# Patient Record
Sex: MALE | Race: WHITE | NOT HISPANIC OR LATINO | Employment: FULL TIME | URBAN - METROPOLITAN AREA
[De-identification: names, ages, dates, MRNs, and addresses within clinical notes are randomized per-mention and may not be internally consistent; named-entity substitution may affect disease eponyms.]

---

## 2023-11-13 ENCOUNTER — OFFICE VISIT (OUTPATIENT)
Dept: FAMILY MEDICINE | Facility: CLINIC | Age: 43
End: 2023-11-13

## 2023-11-13 VITALS
OXYGEN SATURATION: 98 % | WEIGHT: 191.8 LBS | HEIGHT: 76 IN | BODY MASS INDEX: 23.36 KG/M2 | SYSTOLIC BLOOD PRESSURE: 129 MMHG | HEART RATE: 55 BPM | DIASTOLIC BLOOD PRESSURE: 77 MMHG

## 2023-11-13 DIAGNOSIS — R07.89 CHEST WALL PAIN: Primary | ICD-10-CM

## 2023-11-13 PROBLEM — L65.9 HAIR THINNING: Status: ACTIVE | Noted: 2023-11-13

## 2023-11-13 PROCEDURE — 99213 OFFICE O/P EST LOW 20 MIN: CPT

## 2023-11-13 RX ORDER — CETIRIZINE HYDROCHLORIDE 10 MG/1
10 TABLET ORAL DAILY
COMMUNITY

## 2023-11-13 RX ORDER — FINASTERIDE 1 MG/1
1 TABLET, FILM COATED ORAL DAILY
COMMUNITY

## 2023-11-13 NOTE — PROGRESS NOTES
Assessment & Plan     Chest wall pain  -Symptoms at this time sound related to a musculoskeletal soft tissue chest wall pain.   -No evidence of bony rib fractures.  -Recommend Ibuprofen as needed for episodes.   -Based on exam and history, CXR not indicated.  I offered one the the patient anyway, but they declined.   -NSAID's activity as tolerated.   -Can expect waxing and waning course of gradual improvement over next few weeks.  -Discussed risk of PNE and atelectasis with underventilation. Asked them to brace well with coughing and to deep breathe as much as possible to help keep the lungs expanded as to avoid infection.    -Discussed to return if any changes in symptoms, including shortness of breath, dyspnea on exertion, fevers, chills, productive cough.             FUTURE APPOINTMENTS:       - Follow-up for annual visit or as needed  See Patient Instructions    No follow-ups on file.    JACK [facility]    Subjective   Lion is a 43 year old, presenting for the following health issues:  New Patient (Wife found us for him - needed PCP) and Breathing Problem (Off/on - chest pain x 6 weeks )    HPI       Chest Pain/breathing  Onset/Duration: 6 weeks ago, Started out of nowhere, contributes it to possibly having Covid. Symptoms started out as lasting a full day but now just short episodes intermittently with deep breaths.   Description:   Location: Middle  Character: achey, occ sharp  Radiation: none  Duration: intermittent   Intensity: 4/10  Progression of Symptoms: improving  Accompanying Signs & Symptoms:  Shortness of breath: YES, has got better  Sweating: YES, once over duration of symptoms at night  Nausea/vomiting: No  Lightheadedness: YES  Palpitations: YES  Fever/Chills: YES- Chills. 99.9 F, One day about 10 days ago  Cough: No           Heartburn: No  History:   Family history of heart disease: No  Tobacco use: No  Previous similar symptoms: no   Precipitating factors:  "  Worse with exertion: YES- but overall has improved and only occurs with certain motions now  Worse with deep breaths: YES           Related to eating: No           Better with burping: No  Alleviating factors: None, sleeping  Therapies tried and outcome: nothing        Review of Systems   Constitutional, HEENT, cardiovascular, pulmonary, gi and gu systems are negative, except as otherwise noted.      Objective    /77   Pulse 55   Ht 1.918 m (6' 3.5\")   Wt 87 kg (191 lb 12.8 oz)   SpO2 98%   BMI 23.66 kg/m    Body mass index is 23.66 kg/m .  Physical Exam   GENERAL: healthy, alert and no distress  NECK: no adenopathy, no asymmetry, masses, or scars and thyroid normal to palpation  RESP: lungs clear to auscultation - no rales, rhonchi or wheezes  CV: regular rate and rhythm, normal S1 S2, no S3 or S4, no murmur, click or rub, no peripheral edema and peripheral pulses strong  MS: no gross musculoskeletal defects noted, no edema  NEURO: Normal strength and tone, mentation intact and speech normal  PSYCH: mentation appears normal, affect normal/bright            Chanda Nicholson, JACK CNP on 11/13/2023 at 9:10 AM        "

## 2024-04-01 ENCOUNTER — OFFICE VISIT (OUTPATIENT)
Dept: FAMILY MEDICINE | Facility: CLINIC | Age: 44
End: 2024-04-01

## 2024-04-01 VITALS
OXYGEN SATURATION: 98 % | HEIGHT: 75 IN | SYSTOLIC BLOOD PRESSURE: 126 MMHG | DIASTOLIC BLOOD PRESSURE: 84 MMHG | BODY MASS INDEX: 23.67 KG/M2 | HEART RATE: 56 BPM | WEIGHT: 190.4 LBS

## 2024-04-01 DIAGNOSIS — Z13.6 ENCOUNTER FOR SCREENING FOR CARDIOVASCULAR DISORDERS: ICD-10-CM

## 2024-04-01 DIAGNOSIS — Z13.228 ENCOUNTER FOR SCREENING FOR METABOLIC DISORDER: ICD-10-CM

## 2024-04-01 DIAGNOSIS — Z13.1 SCREENING FOR DIABETES MELLITUS: ICD-10-CM

## 2024-04-01 DIAGNOSIS — Z00.00 ROUTINE GENERAL MEDICAL EXAMINATION AT A HEALTH CARE FACILITY: Primary | ICD-10-CM

## 2024-04-01 LAB
ALBUMIN SERPL BCG-MCNC: 4.6 G/DL (ref 3.5–5.2)
ALP SERPL-CCNC: 60 U/L (ref 40–150)
ALT SERPL W P-5'-P-CCNC: 26 U/L (ref 0–70)
ANION GAP SERPL CALCULATED.3IONS-SCNC: 10 MMOL/L (ref 7–15)
AST SERPL W P-5'-P-CCNC: 29 U/L (ref 0–45)
BILIRUB SERPL-MCNC: 0.2 MG/DL
BUN SERPL-MCNC: 17.5 MG/DL (ref 6–20)
CALCIUM SERPL-MCNC: 9.4 MG/DL (ref 8.6–10)
CHLORIDE SERPL-SCNC: 100 MMOL/L (ref 98–107)
CHOLESTEROL: 241 MG/DL (ref 100–199)
CREAT SERPL-MCNC: 1.23 MG/DL (ref 0.67–1.17)
DEPRECATED HCO3 PLAS-SCNC: 28 MMOL/L (ref 22–29)
EGFRCR SERPLBLD CKD-EPI 2021: 75 ML/MIN/1.73M2
FASTING?: YES
GLUCOSE SERPL-MCNC: 78 MG/DL (ref 70–99)
HBA1C MFR BLD: 5.4 %
HDL (RMG): 55 MG/DL (ref 40–?)
LDL CALCULATED (RMG): 156 MG/DL (ref 0–130)
POTASSIUM SERPL-SCNC: 4.2 MMOL/L (ref 3.4–5.3)
PROT SERPL-MCNC: 7.3 G/DL (ref 6.4–8.3)
SODIUM SERPL-SCNC: 138 MMOL/L (ref 135–145)
TRIGLYCERIDES (RMG): 148 MG/DL (ref 0–149)

## 2024-04-01 PROCEDURE — 83036 HEMOGLOBIN GLYCOSYLATED A1C: CPT | Performed by: FAMILY MEDICINE

## 2024-04-01 PROCEDURE — 80061 LIPID PANEL: CPT | Mod: QW | Performed by: FAMILY MEDICINE

## 2024-04-01 PROCEDURE — 80053 COMPREHEN METABOLIC PANEL: CPT | Performed by: FAMILY MEDICINE

## 2024-04-01 PROCEDURE — 36415 COLL VENOUS BLD VENIPUNCTURE: CPT | Performed by: FAMILY MEDICINE

## 2024-04-01 PROCEDURE — 99396 PREV VISIT EST AGE 40-64: CPT | Performed by: FAMILY MEDICINE

## 2024-04-01 SDOH — HEALTH STABILITY: PHYSICAL HEALTH: ON AVERAGE, HOW MANY MINUTES DO YOU ENGAGE IN EXERCISE AT THIS LEVEL?: 150+ MIN

## 2024-04-01 SDOH — HEALTH STABILITY: PHYSICAL HEALTH: ON AVERAGE, HOW MANY DAYS PER WEEK DO YOU ENGAGE IN MODERATE TO STRENUOUS EXERCISE (LIKE A BRISK WALK)?: 5 DAYS

## 2024-04-01 ASSESSMENT — SOCIAL DETERMINANTS OF HEALTH (SDOH): HOW OFTEN DO YOU GET TOGETHER WITH FRIENDS OR RELATIVES?: TWICE A WEEK

## 2024-04-01 NOTE — PATIENT INSTRUCTIONS
Preventive Care Advice   This is general advice given by our system to help you stay healthy. However, your care team may have specific advice just for you. Please talk to your care team about your preventive care needs.  Nutrition  Eat 5 or more servings of fruits and vegetables each day.  Try wheat bread, brown rice and whole grain pasta (instead of white bread, rice, and pasta).  Get enough calcium and vitamin D. Check the label on foods and aim for 100% of the RDA (recommended daily allowance).  Lifestyle  Exercise at least 150 minutes each week   (30 minutes a day, 5 days a week).  Do muscle strengthening activities 2 days a week. These help control your weight and prevent disease.  No smoking.  Wear sunscreen to prevent skin cancer.  Have a dental exam and cleaning every 6 months.  Yearly exams  See your health care team every year to talk about:  Any changes in your health.  Any medicines your care team has prescribed.  Preventive care, family planning, and ways to prevent chronic diseases.  Shots (vaccines)   HPV shots (up to age 26), if you've never had them before.  Hepatitis B shots (up to age 59), if you've never had them before.  COVID-19 shot: Get this shot when it's due.  Flu shot: Get a flu shot every year.  Tetanus shot: Get a tetanus shot every 10 years.  Pneumococcal, hepatitis A, and RSV shots: Ask your care team if you need these based on your risk.  Shingles shot (for age 50 and up).  General health tests  Diabetes screening:  Starting at age 35, Get screened for diabetes at least every 3 years.  If you are younger than age 35, ask your care team if you should be screened for diabetes.  Cholesterol test: At age 39, start having a cholesterol test every 5 years, or more often if advised.  Bone density scan (DEXA): At age 50, ask your care team if you should have this scan for osteoporosis (brittle bones).  Hepatitis C: Get tested at least once in your life.  STIs (sexually transmitted  infections)  Before age 24: Ask your care team if you should be screened for STIs.  After age 24: Get screened for STIs if you're at risk. You are at risk for STIs (including HIV) if:  You are sexually active with more than one person.  You don't use condoms every time.  You or a partner was diagnosed with a sexually transmitted infection.  If you are at risk for HIV, ask about PrEP medicine to prevent HIV.  Get tested for HIV at least once in your life, whether you are at risk for HIV or not.  Cancer screening tests  Cervical cancer screening: If you have a cervix, begin getting regular cervical cancer screening tests at age 21. Most people who have regular screenings with normal results can stop after age 65. Talk about this with your provider.  Breast cancer scan (mammogram): If you've ever had breasts, begin having regular mammograms starting at age 40. This is a scan to check for breast cancer.  Colon cancer screening: It is important to start screening for colon cancer at age 45.  Have a colonoscopy test every 10 years (or more often if you're at risk) Or, ask your provider about stool tests like a FIT test every year or Cologuard test every 3 years.  To learn more about your testing options, visit: https://www.[employer]/229296.pdf.  For help making a decision, visit: https://bit.ly/ls97524.  Prostate cancer screening test: If you have a prostate and are age 55 to 69, ask your provider if you would benefit from a yearly prostate cancer screening test.  Lung cancer screening: If you are a current or former smoker age 50 to 80, ask your care team if ongoing lung cancer screenings are right for you.  For informational purposes only. Not to replace the advice of your health care provider. Copyright   2023 [employer]. All rights reserved. Clinically reviewed by the [facility] Transitions Program. [employer] [id number] - REV 01/24.

## 2024-04-01 NOTE — PROGRESS NOTES
Preventive Care Visit  [facility]  Stew Pascual MD, Family Medicine  Apr 1, 2024      Assessment & Plan     Routine general medical examination at a health care facility  - discussed preventative guidelines, healthy diet, exercise and weight management  - VENOUS COLLECTION    Encounter for screening for cardiovascular disorders    - Lipid Profile ([facility])  - CT Calcium Screening; Future  - VENOUS COLLECTION    Encounter for screening for metabolic disorder    - Comprehensive metabolic panel; Future  - VENOUS COLLECTION  - Comprehensive metabolic panel    Screening for diabetes mellitus    - Hemoglobin A1c; Future  - VENOUS COLLECTION  - Hemoglobin A1c    Patient has been advised of split billing requirements and indicates understanding: Yes          Counseling  Appropriate preventive services were discussed with this patient, including applicable screening as appropriate for fall prevention, nutrition, physical activity, Tobacco-use cessation, weight loss and cognition.  Checklist reviewing preventive services available has been given to the patient.  Reviewed patient's diet, addressing concerns and/or questions.       See Patient Instructions    Return in about 53 weeks (around 4/7/2025) for Annual Wellness Visit.    Jese Seymour is a 43 year old, presenting for the following:  Physical         Health Care Directive  Patient does not have a Health Care Directive or Living Will: Discussed advance care planning with patient; however, patient declined at this time.    HPI    Here for CPE.  No specific concerns.  Doing well          4/1/2024   General Health   How would you rate your overall physical health? Good   Feel stress (tense, anxious, or unable to sleep) Not at all         4/1/2024   Nutrition   Three or more servings of calcium each day? Yes   Diet: Regular (no restrictions)   How many servings of fruit and vegetables per day? 4 or more   How many sweetened beverages each day? 0-1  "        4/1/2024   Exercise   Days per week of moderate/strenous exercise 5 days   Average minutes spent exercising at this level 150+ min         4/1/2024   Social Factors   Frequency of gathering with friends or relatives Twice a week   Worry food won't last until get money to buy more No   Food not last or not have enough money for food? No   Do you have housing?  Yes   Are you worried about losing your housing? No   Lack of transportation? No   Unable to get utilities (heat,electricity)? No         4/1/2024   Dental   Dentist two times every year? Yes         4/1/2024   TB Screening   Were you born outside of the [place]? No         Today's PHQ-2 Score:       4/1/2024     3:35 PM   PHQ-2 ( 1999 [employer])   Q1: Little interest or pleasure in doing things 0   Q2: Feeling down, depressed or hopeless 0   PHQ-2 Score 0           4/1/2024   Substance Use   Alcohol more than 3/day or more than 7/wk No   Do you use any other substances recreationally? (!) CANNABIS PRODUCTS     Social History     Tobacco Use    Smoking status: Never    Smokeless tobacco: Never   Substance Use Topics    Alcohol use: Yes     Comment: couple drinks a week             4/1/2024   One time HIV Screening   Previous HIV test? No         4/1/2024   STI Screening   New sexual partner(s) since last STI/HIV test? No   ASCVD Risk   The ASCVD Risk score (Chalino GO, et al., 2019) failed to calculate for the following reasons:    Cannot find a previous HDL lab    Cannot find a previous total cholesterol lab        4/1/2024   Contraception/Family Planning   Questions about contraception or family planning No        Reviewed and updated as needed this visit by Provider                          Review of Systems  Constitutional, HEENT, cardiovascular, pulmonary, GI, , musculoskeletal, neuro, skin, endocrine and psych systems are negative, except as otherwise noted.     Objective    Exam  /84   Pulse 56   Ht 1.905 m (6' 3\")   Wt 86.4 kg (190 lb " "6.4 oz)   SpO2 98%   BMI 23.80 kg/m     Estimated body mass index is 23.8 kg/m  as calculated from the following:    Height as of this encounter: 1.905 m (6' 3\").    Weight as of this encounter: 86.4 kg (190 lb 6.4 oz).    Physical Exam  GENERAL: alert and no distress  EYES: Eyes grossly normal to inspection, PERRL and conjunctivae and sclerae normal  HENT: ear canals and TM's normal, nose and mouth without ulcers or lesions  NECK: no adenopathy, no asymmetry, masses, or scars  RESP: lungs clear to auscultation - no rales, rhonchi or wheezes  CV: regular rate and rhythm, normal S1 S2, no S3 or S4, no murmur, click or rub, no peripheral edema  ABDOMEN: soft, nontender, no hepatosplenomegaly, no masses and bowel sounds normal  MS: no gross musculoskeletal defects noted, no edema  SKIN: no suspicious lesions or rashes  NEURO: Normal strength and tone, mentation intact and speech normal  PSYCH: mentation appears normal, affect normal/bright        Signed Electronically by: Stew Pascual MD    "

## 2024-04-01 NOTE — LETTER
"April 4, 2024        Lion Hardy  [address] [place] [address]        Dear Lion,     Your recent lab results are within the expected range.     Your creatinine is a measure of kidney function and is mildly elevated.  However, your GFR is normal and I rely on this value more.  Creatinine can be increased in people with more muscle mass.     Your liver function and electrolytes are normal.     Your cholesterol levels are mildly elevated.  I would recommend a diet similar to that of the \"Mediterranean diet.\"  This is high in vegetables, healthy fats (olive oil, avocado, salmon), lean protein and whole grains.  It is important to limit simple sugars and saturated fats.  It is also important to exercise regularly.  No medication is needed but we should recheck this again in one year.  The calcium scan will also be helpful.     Your A1c, which reflects your blood sugar control over the past 3 months, is normal.        It was great meeting you.  If you have any questions, please contact our office.        Sincerely,     Stew Pascual MD     Resulted Orders   Lipid Profile ([facility])   Result Value Ref Range    Cholesterol 241 (A) 100 - 199 mg/dL    HDL 55 40 mg/dL    Triglycerides 148 0 - 149 mg/dL    LDL CALCULATED ([facility]) 156 (A) 0 - 130 mg/dL    Patient Fasting? Yes    Comprehensive metabolic panel   Result Value Ref Range    Sodium 138 135 - 145 mmol/L      Comment:      Reference intervals for this test were updated on 09/26/2023 to more accurately reflect our healthy population. There may be differences in the flagging of prior results with similar values performed with this method. Interpretation of those prior results can be made in the context of the updated reference intervals.     Potassium 4.2 3.4 - 5.3 mmol/L    Carbon Dioxide (CO2) 28 22 - 29 mmol/L    Anion Gap 10 7 - 15 mmol/L    Urea Nitrogen 17.5 6.0 - 20.0 mg/dL    Creatinine 1.23 (H) 0.67 - 1.17 mg/dL    GFR Estimate 75 >60 mL/min/1.73m2    " Calcium 9.4 8.6 - 10.0 mg/dL    Chloride 100 98 - 107 mmol/L    Glucose 78 70 - 99 mg/dL    Alkaline Phosphatase 60 40 - 150 U/L      Comment:      Reference intervals for this test were updated on 11/14/2023 to more accurately reflect our healthy population. There may be differences in the flagging of prior results with similar values performed with this method. Interpretation of those prior results can be made in the context of the updated reference intervals.    AST 29 0 - 45 U/L      Comment:      Reference intervals for this test were updated on 6/12/2023 to more accurately reflect our healthy population. There may be differences in the flagging of prior results with similar values performed with this method. Interpretation of those prior results can be made in the context of the updated reference intervals.    ALT 26 0 - 70 U/L      Comment:      Reference intervals for this test were updated on 6/12/2023 to more accurately reflect our healthy population. There may be differences in the flagging of prior results with similar values performed with this method. Interpretation of those prior results can be made in the context of the updated reference intervals.      Protein Total 7.3 6.4 - 8.3 g/dL    Albumin 4.6 3.5 - 5.2 g/dL    Bilirubin Total 0.2 <=1.2 mg/dL   Hemoglobin A1c   Result Value Ref Range    Hemoglobin A1C 5.4 <5.7 %      Comment:      Normal <5.7%   Prediabetes 5.7-6.4%    Diabetes 6.5% or higher     Note: Adopted from ADA consensus guidelines.       If you have any questions or concerns, please call the clinic at the number listed above.       Sincerely,      Stew Pascual MD

## 2024-08-15 ENCOUNTER — TELEPHONE (OUTPATIENT)
Dept: FAMILY MEDICINE | Facility: CLINIC | Age: 44
End: 2024-08-15

## 2024-08-15 NOTE — TELEPHONE ENCOUNTER
1st attempt- Sent MyChart for the patient to call back and schedule the following:    Appointment type:  Testing only- No clinic visit  Provider:  Ankur  Return date:  next avail  Additional appointment(s needed:  CT Calcium  Additonal Notes:     Specialty phone number: [phone]

## 2024-09-28 ENCOUNTER — OFFICE VISIT (OUTPATIENT)
Dept: URGENT CARE | Facility: URGENT CARE | Age: 44
End: 2024-09-28
Payer: COMMERCIAL

## 2024-09-28 VITALS
OXYGEN SATURATION: 98 % | TEMPERATURE: 97.8 F | SYSTOLIC BLOOD PRESSURE: 112 MMHG | RESPIRATION RATE: 16 BRPM | BODY MASS INDEX: 24.37 KG/M2 | WEIGHT: 195 LBS | DIASTOLIC BLOOD PRESSURE: 76 MMHG | HEART RATE: 64 BPM

## 2024-09-28 DIAGNOSIS — J30.2 SEASONAL ALLERGIC RHINITIS, UNSPECIFIED TRIGGER: ICD-10-CM

## 2024-09-28 DIAGNOSIS — J02.9 PHARYNGITIS, UNSPECIFIED ETIOLOGY: Primary | ICD-10-CM

## 2024-09-28 DIAGNOSIS — H65.03 NON-RECURRENT ACUTE SEROUS OTITIS MEDIA OF BOTH EARS: ICD-10-CM

## 2024-09-28 LAB
DEPRECATED S PYO AG THROAT QL EIA: NEGATIVE
GROUP A STREP BY PCR: NOT DETECTED

## 2024-09-28 PROCEDURE — 99203 OFFICE O/P NEW LOW 30 MIN: CPT | Performed by: FAMILY MEDICINE

## 2024-09-28 PROCEDURE — 87651 STREP A DNA AMP PROBE: CPT | Performed by: FAMILY MEDICINE

## 2024-09-28 RX ORDER — FLUTICASONE PROPIONATE 50 MCG
1 SPRAY, SUSPENSION (ML) NASAL 2 TIMES DAILY
Qty: 16 G | Refills: 0 | Status: SHIPPED | OUTPATIENT
Start: 2024-09-28

## 2024-09-28 NOTE — PROGRESS NOTES
ASSESSMENT/PLAN:      ICD-10-CM    1. Pharyngitis, unspecified etiology  J02.9 Streptococcus A Rapid Screen w/Reflex to PCR - Clinic Collect     Group A Streptococcus PCR Throat Swab    Strep negative, PCR pending      2. Non-recurrent acute serous otitis media of both ears  H65.03 fluticasone (FLONASE) 50 MCG/ACT nasal spray      3. Seasonal allergic rhinitis, unspecified trigger  J30.2           Patient Instructions   Fluid behind the eardrums-called serous otitis media     Start Mucinex -1200 mg daily -the generic is fine -for congestion/sinus congestion and fluid behind the ear drums for 14 days     Start Flonase1 spray in each nostril in am and bedtime for nasal congestion and drainage and fluid behind the ear drums for 14 days     Continue zyrtec daily      Your rapid strep test was negative-your sore could be due to allergies or could be a viral sore throat the pain should resolve within 48 hours of onset of pain    For throat pain    Start  Ibuprofen (motrin/advil)  600 mg 3 times a day always take with food for the next 2 to 3 days or until pain resolves-maximum dose of ibuprofen is 1800 mg in 24 hours     If you have pain before you are due for your next dose of ibuprofen start Tylenol as noted below    Acetaminophen (Tylenol ) 1000 mg 3 times a day for the next 5 to 7 days until pain resolves-maximum dose of acetaminophen (tylenol)  is 3000 mg in 24-hours           if your second strep test is positive  Please call the nurse line-number note on corner of your handout  and they will have an on call provider order antibiotic for you   You will need to be on antibiotic treatment for 12 hours before returning to school/  Change your toothbrush in 3 days to prevent reinfection.  For your sore throat, you may try tylenol/ibuprofen, , warm beverages.  Make sure to drink plenty of fluids and wash your hands often.  Follow-up with your PCP if you have continued pain in 3-5 days.           Return to clinic or  to ER if symptoms worsen     Follow up with your primary care provider or clinic in about 2-3 days  if your symptoms do not improve            Reviewed medication instructions and side effects. Follow up if experiences side effects.     I reviewed supportive care, otc meds to use if needed, expected course, and signs of concern.  Follow up as needed or if he does not improve within  1-2 days or if worsens in any way.  Reviewed red flag symptoms and is to go to the ER if experiences any of these.     The use of Dragon/PowerMic dictation services may have been used to construct the content in this note; any grammatical or spelling errors are non-intentional. Please contact the author of this note directly if you are in need of any clarification.                   Patient presents with:  Pharyngitis: Sore throat since yesterday.  Urgent Care       Subjective     Lion Hardy is a 44 year old male who presents to clinic today for the following health issues:    HPI     Acute Illness  Acute illness concerns: Onset yesterday sore throat, painful to swallow, nasal congestion and myalgias    Symptoms:  Fever: No  Chills/Sweats: No  Headache (location?): No  Sinus Pressure: No  Conjunctivitis:  No  Ear Pain: YES: Ear fullness intermittent ear discomfort  Rhinorrhea: YES  Congestion: YES  Sore Throat: YES-painful to swallow  Cough: no  Wheeze: No-history of asthma  Decreased Appetite: No  Nausea: No  Vomiting: No  Diarrhea: No  Fatigue/Achiness: YES-achiness, mild fatigue  Sick/Strep Exposure: YES-children-no symptoms, attending school, strep and COVID prevalent at school  Therapies tried and outcome:   Took a home COVID test this a.m. which was negative,   he is completing his vaccinations for COVID,   his last COVID was 6 weeks ago      No past medical history on file.  Social History     Tobacco Use    Smoking status: Never    Smokeless tobacco: Never   Substance Use Topics    Alcohol use: Yes     Comment: couple  drinks a week       Current Outpatient Medications   Medication Sig Dispense Refill    fluticasone (FLONASE) 50 MCG/ACT nasal spray Spray 1 spray into both nostrils 2 times daily. 16 g 0    cetirizine (ZYRTEC) 10 MG tablet Take 10 mg by mouth daily      finasteride (PROPECIA) 1 MG tablet Take 1 mg by mouth daily       No Known Allergies          ROS are negative, except as otherwise noted HPI      Objective    /76   Pulse 64   Temp 97.8  F (36.6  C) (Tympanic)   Resp 16   Wt 88.5 kg (195 lb)   SpO2 98%   BMI 24.37 kg/m    Body mass index is 24.37 kg/m .  Physical Exam     GENERAL:  Alert, mild distress.   HEENT: Diffuse pharyngeal erythema. Tonsils erythematous mildly enlarged no exudate.  Sclera, lids and conjunctiva are normal.  Nose congestion and ears-canals clear bilateral, pinnas clear bilateral, TMs dull air-fluid levels bilateral  NECK: A few shotty anterior chain bilateral adenopathy.  CHEST:  clear, no wheezing or rales. Normal symmetric air entry throughout both lung fields.   HEART:  S1 and S2 normal, no murmurs, clicks, gallops or rubs. Regular rate and rhythm.  NEURO:Alert and oriented x3, normal strength and tone, normal gait      Diagnostic Test Results:  Labs reviewed in Epic  Results for orders placed or performed in visit on 09/28/24   Streptococcus A Rapid Screen w/Reflex to PCR - Clinic Collect     Status: Normal    Specimen: Throat; Swab   Result Value Ref Range    Group A Strep antigen Negative Negative   Group A Streptococcus PCR Throat Swab     Status: Normal    Specimen: Throat; Swab   Result Value Ref Range    Group A strep by PCR Not Detected Not Detected    Narrative    The Xpert Xpress Strep A test, performed on the [employer], is a rapid, qualitative in vitro diagnostic test for the detection of Streptococcus pyogenes (Group A ß-hemolytic Streptococcus, Strep A) in throat swab specimens from patients with signs and symptoms of pharyngitis. The Xpert  Xpress Strep A test can be used as an aid in the diagnosis of Group A Streptococcal pharyngitis. The assay is not intended to monitor treatment for Group A Streptococcus infections. The Xpert Xpress Strep A test utilizes an automated real-time polymerase chain reaction (PCR) to detect Streptococcus pyogenes DNA.

## 2024-09-28 NOTE — PATIENT INSTRUCTIONS
Fluid behind the eardrums-called serous otitis media     Start Mucinex -1200 mg daily -the generic is fine -for congestion/sinus congestion and fluid behind the ear drums for 14 days     Start Flonase1 spray in each nostril in am and bedtime for nasal congestion and drainage and fluid behind the ear drums for 14 days     Continue zyrtec daily      Your rapid strep test was negative-your sore could be due to allergies or could be a viral sore throat the pain should resolve within 48 hours of onset of pain    For throat pain    Start  Ibuprofen (motrin/advil)  600 mg 3 times a day always take with food for the next 2 to 3 days or until pain resolves-maximum dose of ibuprofen is 1800 mg in 24 hours     If you have pain before you are due for your next dose of ibuprofen start Tylenol as noted below    Acetaminophen (Tylenol ) 1000 mg 3 times a day for the next 5 to 7 days until pain resolves-maximum dose of acetaminophen (tylenol)  is 3000 mg in 24-hours           if your second strep test is positive  Please call the nurse line-number note on corner of your handout  and they will have an on call provider order antibiotic for you   You will need to be on antibiotic treatment for 12 hours before returning to school/  Change your toothbrush in 3 days to prevent reinfection.  For your sore throat, you may try tylenol/ibuprofen, , warm beverages.  Make sure to drink plenty of fluids and wash your hands often.  Follow-up with your PCP if you have continued pain in 3-5 days.           Return to clinic or to ER if symptoms worsen     Follow up with your primary care provider or clinic in about 2-3 days  if your symptoms do not improve

## 2025-05-03 ENCOUNTER — HEALTH MAINTENANCE LETTER (OUTPATIENT)
Age: 45
End: 2025-05-03

## 2025-05-08 ENCOUNTER — OFFICE VISIT (OUTPATIENT)
Dept: FAMILY MEDICINE | Facility: CLINIC | Age: 45
End: 2025-05-08

## 2025-05-08 VITALS
WEIGHT: 187.6 LBS | HEIGHT: 75 IN | OXYGEN SATURATION: 98 % | SYSTOLIC BLOOD PRESSURE: 130 MMHG | HEART RATE: 68 BPM | BODY MASS INDEX: 23.32 KG/M2 | DIASTOLIC BLOOD PRESSURE: 67 MMHG

## 2025-05-08 DIAGNOSIS — M1A.9XX0 CHRONIC GOUT INVOLVING TOE OF RIGHT FOOT WITHOUT TOPHUS, UNSPECIFIED CAUSE: Primary | ICD-10-CM

## 2025-05-08 PROCEDURE — 99213 OFFICE O/P EST LOW 20 MIN: CPT | Performed by: STUDENT IN AN ORGANIZED HEALTH CARE EDUCATION/TRAINING PROGRAM

## 2025-05-08 RX ORDER — INDOMETHACIN 50 MG/1
50 CAPSULE ORAL
Qty: 30 CAPSULE | Refills: 0 | Status: SHIPPED | OUTPATIENT
Start: 2025-05-08 | End: 2025-05-18

## 2025-05-08 NOTE — PROGRESS NOTES
Assessment & Plan   Assessment & Plan  Chronic gout involving toe of right foot without tophus, unspecified cause  -patient endorses swelling and tenderness around right bunion   -original history was concerning for possible ortho pain   -has been diagnosed with gout and has strong family history of gout  -will refill indomethacin  -given that this episode is improving, will not order uric acid at this time, can consider with annual physical blood work  Orders:    indomethacin (INDOCIN) 50 MG capsule; Take 1 capsule (50 mg) by mouth 3 times daily (with meals) for 10 days.       Follow-up   Return for Routine preventive.    Subjective   Lion is a 44 year old, presenting for the following health issues:  Arthritis (Fhx of gout, has been dealing with pain since Sunday on R big toe, and has been keeping up with hydration, and icing foot, has a rx for flare ups when they occur although he is out, and looking to get more )    Arthritis    History of Present Illness       Reason for visit:  Need new prescription for occasional but rare gout flares    He eats 4 or more servings of fruits and vegetables daily.He consumes 1 sweetened beverage(s) daily.He exercises with enough effort to increase his heart rate 60 or more minutes per day.  He exercises with enough effort to increase his heart rate 6 days per week.   He is taking medications regularly.      Patient states that he was diagnosed with gout a number of years ago. Patient states that all males on his dad's side of the family has gout. Patient states that he changed his lifestyle at this time. Severe episodes are brought on by physical agitation. Patient notes a prodrome to a severe episode, decreases any alcohol or red meat and takes indomethacin. Patient does not often have flares. Patient started to have flare on his right big toe on Sunday and is out of indomethacin. Patient states that he had a moderate episode, was able to continue walking. Patient states  "that his symptoms started to improve, but he's interested in having indomethacin at home for any future flares.    Review of Systems   Musculoskeletal:  Positive for arthritis and joint pain.          Objective    /67   Pulse 68   Ht 1.905 m (6' 3\")   Wt 85.1 kg (187 lb 9.6 oz)   SpO2 98%   BMI 23.45 kg/m    Body mass index is 23.45 kg/m .  Physical Exam  Constitutional:       Appearance: Normal appearance.   HENT:      Head: Normocephalic and atraumatic.   Eyes:      Conjunctiva/sclera: Conjunctivae normal.   Cardiovascular:      Rate and Rhythm: Normal rate and regular rhythm.      Heart sounds: Normal heart sounds. No murmur heard.  Pulmonary:      Effort: Pulmonary effort is normal. No respiratory distress.      Breath sounds: Normal breath sounds.   Musculoskeletal:      Right foot: Bunion present.        Feet:    Feet:      Right foot:      Skin integrity: Skin integrity normal. No erythema.      Toenail Condition: Right toenails are normal.      Comments: Mild tenderness to palpation  Neurological:      Mental Status: He is alert.   Psychiatric:         Thought Content: Thought content normal.        Signed Electronically by: Clementina Zhong DO    "

## 2025-05-21 ENCOUNTER — TRANSFERRED RECORDS (OUTPATIENT)
Dept: FAMILY MEDICINE | Facility: CLINIC | Age: 45
End: 2025-05-21